# Patient Record
(demographics unavailable — no encounter records)

---

## 2018-03-11 NOTE — NUR
PT AWAKE WATCHING TELEVISION, BS RECHECKED 271. PT DENIES PAIN @ THIS TIME. NO HEADACHE OR 
DIAPHORESIS NOTED. INSULIN DRIP RUNNING @ 3 UNIT/HR. NO OTHER S/S OF DISTRESS NOTED WILL 
CONTINUE TO OBSERVE.

## 2018-03-11 NOTE — NUR
PT AWAKE ALERT ORIENTED X4. PERRLA +3. STRONG UPPER AND LOWER EXTREMITIES. SINUS TACH 
110-120S. NO EDEMA NOTED. LUNGS CLEAR, DIMINISHED @ BASES. ABD SOFT NON DISTENDED. PT HAD 
DINNER TONIGHT 80-90% EATEN. PT AMB @ BEDSIDE TO BEDSIDE COMMODE. SKIN INTACT. PERIPHERAL 
IVS NOTED TO L HAND AND R ANTECUBITAL. INSULIN DRIP @ 5 UNITS/HR. PT DENIES FEVER, COUGH, 
LIGHTHEADEDNESS AND PAIN @ THIS TIME. NO OTHER S/S OF ACUTE DISTRESS NOTED. WILL CONTINUE TO 
OBSERVE.

## 2018-03-11 NOTE — NUR
AT 1345 MA RECEIVED FROM ER VIA GURNEY. PT A/O X4, ABLE TO MAKE NEEDS KNOWN. ORIENTED TO 
ICU, STAFF, BED, CALL LIGHT. PT ON BEDSIDE MONITOR. SKIN DRY AND WARM TO TOUCH. PUPILS 
REACTIVE TO LIGHT. LUNGS SOUND CLEAR ON AUSCULTATION. PERIPHERAL LINE ON RIGHT UPPER ARM 20 
G. 0.9% SODIUM CHLORIDE BOLUS RUNNING. IV SITE INTACT. ABDOMEN SOFT ROUND AND NON-TENDER. 
ACTIVE BOWEL SOUND ON AUSCULTATION. SKIN INTACT. DENIES PAIN AT THIS TIME. KEPT HOB 
ELEVATED. PLACED SCDS ON BLE AS PER ORDER. WILL CONTINUE TO MONITOR.

## 2018-03-11 NOTE — NUR
SPOKE WITH DR. GONZALES (ON CALL) TO CLARIFY IVF ORDERS. DR. FARNSWORTH ORDERED NS  
MLS/HR AND NS WITH 20MEQ KCL  MLS/HR (ONCE). INFORMED DR. GONZALES THAT CURRENT 
POTASSIUM LEVEL IS 4.4, BS AT 2000 . DR. GONZALES ORDERED TO DC NS  MLS AND 
CONTINUE NS WITH 20 % KCL  MLS/HR.

## 2018-03-11 NOTE — NUR
PT RESTING IN BED COMFORTABLY. NO RESPIRATORY DISTRESS. ST ON MONITOR. NO CHANGE IN LOC. 
WILL CONTINUE TO MONITOR.

## 2018-03-11 NOTE — NUR
report received from am nurse. orders labs reviewed. no acute distress noted. will continue 
to observe.

## 2018-03-11 NOTE — NUR
Patient will be admitted to care of DR FARNSWORTH. Admited to ICU.  Will go to room 
ICU5. Belongings list completed.  Report to JUDE REYNA.

## 2018-03-11 NOTE — NUR
PATIENT PRESENTS TO ED WITH c/o generalized weakness x1 week

hx none; DENIES N/V/D; SKIN IS PINK/WARM/DRY; AAOX4 WITH EVEN AND STEADY GAIT; 
LUNGS CLEAR BL; HR EVEN AND REGULAR; PT DENIES ANY FEVER, CP, SOB, OR COUGH AT 
THIS TIME; PATIENT STATES PAIN OF 0/10 AT THIS TIME; VSS; PATIENT POSITIONED 
FOR COMFORT; HOB ELEVATED; BEDRAILS UP X2; BED DOWN. ER MD MADE AWARE OF PT 
STATUS.

## 2018-03-12 NOTE — NUR
SHIFT ASSESSMENT DONE. PATIENT AWAKE, ALERT AND ORIENTED X 4. VERBALLY RESPONSIVE, ABLE TO 
MAKE NEEDS KNOWN. SKIN WARM AND DRY TO TOUCH, RESPIRATIONS EVEN AND UNLABORED. NO APPARENT 
PHYSICAL DISTRESS. LUNG SOUNDS EQUAL BILATERALLY. RECEIVED ON INSULIN DRIP HUMULIN R 3 
UNITS/HR. NO SIGNS/SYMPTOMS OF HYPERGLYCEMIA NOTED. WILL CONTINUE TO MONITOR BLOOD SUGAR 
Q1H. ABLE TO MOVE ALL EXTREMITIES. DENIES PAIN OR DISCOMFORT. SAFETY PRECAUTIONS MAINTAINED, 
BED ON LOW POSITION, CALL LIGHT WITHIN REACH, BED RAILS X 3, REMINDED TO USE CALL LIGHT 
WITHIN REACH.

## 2018-03-12 NOTE — NUR
CM NOTE

CONCURRENT REVIEW FAXED TO [facility] / FAX# [phone], ATTN: SONNY #[phone]

## 2018-03-12 NOTE — NUR
PATIENT'S FAMILY AT BEDSIDE, UPDATED ON PATIENT'S CONDITION. PRESENTS NO SIGNS OF DISTRESS 
AT THIS TIME. WILL CONTINUE TO MONITOR

## 2018-03-12 NOTE — NUR
RECEIVED BEDSIDE REPORT FROM NIGHT SHIFT RN,RICH, FOR CONTINUITY OF CARE. PATIENT OBSERVED 
RESTING IN BED, SEMI FOWLERS IN LOWEST POSSIBLE POSITION, AAOX4, ABLE TO FOLLOW COMMANDS AND 
MAKE NEEDS KNOWN. SKIN IS INTACT, WARM AND DRY. VVS, SR ON MONITOR, NO COMPLAINTS OF PAIN AT 
THIS TIME. LUNG SOUNDS ARE CLEAR, REGULAR AND SYMMETRICAL. S1 AND S2 HEART SOUNDS HEARD, CAP 
REFILL LESS THAN 3 SECS. PATIENT HAS PERIPHERAL IV TO LEFT HAND, ASYMPTOMATIC, PATENT, AND 
INTACT WITH INSULIN DRIP RUNNING AT 3UNITS/HR. LAST BS  PER NIGHT SHIFT RN. PATIENT 
HAS ANOTHER PERIPHERAL IV TO RIGHT AC, ASYMPTOMATIC, PATENT, AND INTACT. NO SIGNS OF 
DISTRESS NOTED, PATIENT DENIES ANY FEVER OR COUGH AT THIS TIME. WILL CONTINUE TO MONITOR.

## 2018-03-12 NOTE — NUR
PROVIDED PATIENT WITH DINNER TRAY, NO SIGNS OF DISTRESS NOTED. DENIES ANY PAIN OR WEAKNESS 
AT THIS TIME. WILL CONTINUE TO MONITOR

## 2018-03-12 NOTE — NUR
ACCUCHECK DONE: 271 MG/DL. CONTINUE ON INSULIN DRIP 3 UNITS/HR. NO SIGNS/SYMPTOMS OF 
HYPERGLYCEMIA. WILL CONTINUE TO MONITOR.

## 2018-03-12 NOTE — NUR
Accucheck done: 262 mg/dl. Continue on 3 units/hr. No signs/symptoms of hyperglycemia noted. 
Will continue to monitor.

## 2018-03-12 NOTE — NUR
GAVE BEDSIDE REPORT TO NIGHT SHIFT RN, NAVEEN, FOR CONTINUITY OF CARE. PATIENT PRESENTS NO 
SIGNS OF DISTRESS AT THIS TIME.

## 2018-03-12 NOTE — NUR
Accucheck done: 261 mg/dl. Continue on 3 units/hr. No signs/symptoms of hyperglycemia noted. 
Will continue to monitor.

## 2018-03-12 NOTE — NUR
BS CHECKED 245 - INSULIN DRIP @ 2 UNITS/HR. PT RESTING IN BED NO ACUTE S/S OF DISTRESS NOTED 
WILL CONTINUE TO OBSERVE.

## 2018-03-12 NOTE — NUR
ACCUCHECK: 287 MG/DL. CONTINUE ON INSULIN DRIP 3U/HR. NO S/SX OF HYPERGLYCEMIA NOTED. WILL 
CONTINUE TO MONITOR.

## 2018-03-13 NOTE — NUR
ACCUCHECK DONE: 234 MG/DL. CONTINUE INSULIN DRIP 2 UNITS/HR PER PROTOCOL. NO SIGNS/SYMPTOMS 
OF HYPERGLYCEMIA. WILL CONTINUE TO MONITOR.

## 2018-03-13 NOTE — NUR
PATIENT HAS BEEN SCREENED AND CATEGORIZED AS HIGH NUTRITION RISK. PATIENT WILL BE SEEN 
WITHIN 1-2 DAYS OF ADMISSION.



3/12/18 - 3/13/18



NYA HOLLINS RD

## 2018-03-13 NOTE — NUR
ACCUCHECK DONE 236 MG/DL. CONTINUE ON 2 UNITS/HR. NO S/SX OF HYPERGLYCEMIA NOTED. CONTINUE 
TO MONITOR.

## 2018-03-13 NOTE — NUR
ACCUCHECK DONE: 249 MG/DL. CONTINUE INSULIN DRIP 2 UNITS/HR PER PROTOCOL. NO SIGNS/SYMPTOMS 
OF HYPERGLYCEMIA. WILL CONTINUE TO MONITOR.

## 2018-03-13 NOTE — NUR
DINNER SERVED. PT IS AAOX4, VITAL SIGNS STABLE. NO C/O PAIN OR DISCOMFORT. NEEDS WELL 
ATTENDED. SAFETY MEASURES ENSURED. WILL CONTINUE TO MONITOR.

## 2018-03-13 NOTE — NUR
DR. FARNSWORTH IN TO SEE AND EXAMINE PT. MADE AWARE OF POTASSIUM LEVEL OF 3.0. WILL FOLLOW UP ON 
ORDERS.

## 2018-03-13 NOTE — NUR
BS CHECKED 231 NOTED, CONTINUOS 2 UNITS FOR INSULIN DRIP. NO ACUTE DISTRESS NOTED. PT DENIES 
PAIN AT THIS TIME. SR ON THE MONITOR. CONTINUE TO MONITOR.

## 2018-03-13 NOTE — NUR
ACCUCHECK DONE: 219 MG/DL. CONTINUE INSULIN DRIP 2 UNITS/HR PER PROTOCOL. NO SIGNS/SYMPTOMS 
OF HYPERGLYCEMIA. ASLEEP AT THIS TIME. WILL CONTINUE TO MONITOR.

## 2018-03-13 NOTE — NUR
ACCUCHECK DONE: 219 MG/DL. CONTINUE INSULIN DRIP 2 UNITS/HR PER PROTOCOL. NO SIGNS/SYMPTOMS 
OF HYPERGLYCEMIA. ASLEEP AT THIS, NO APPARENT DISTRESS. WILL CONTINUE TO MONITOR.

## 2018-03-13 NOTE — NUR
RECEIVED REPORT FROM MORNING SHIFT NURSE RATANA.RN. PT IS AWAKE, ALERT AND ORIENTED X4, 
VERBALLY RESPONSIVE, ABLE TO MAKE NEEDS KNOWN. BILATERAL LUNG SOUND CLEAR. S1 AND S2 HEARD. 
SR ON THE MONITOR. PT IS ON ROOM AIR O2 SAT 97%. NO ACUTE RESPIRATORY DISTRESS NOTED. BOWEL 
SOUND PRESENT AT ALL 4 QUADS.PATIENT HAS PERIPHERAL IVS G20 TO LEFT HAND, G20 TO RIGHT HAND, 
G22 TO RIGHT FOREARM ALL ASYMPTOMATIC, PATENT, AND INTACT. INSULIN DRIP RUNNING AT 3 
UNITS/HR. SKIN IS INTACT, WARM AND DRY. SAFETY PRECAUTIONS IN PLACE. WILL CONTINUE TO 
MONITOR.

## 2018-03-13 NOTE — NUR
RECEIVED BEDSIDE REPORT FROM NIGHT SHIFT RN. PATIENT IS RESTING IN BED, AAOX4, ABLE TO 
FOLLOW COMMANDS AND MAKE NEEDS KNOWN. AFEBRILE. SR ON MONITOR, S1 S2 HEARD. NO C/O PAIN AT 
THIS TIME. PT IS ON ROOM AIR, LUNG SOUNDS CLEAR, BREATHING EVEN AND UNLABORED. ABDOMEN SOFT, 
NONTENDER WITH ACTIVE BOWEL SOUNDS. PATIENT HAS PERIPHERAL IVS G20 TO LEFT HAND, G20 TO 
RIGHT HAND, G22 TO RIGHT FOREARM ALL ASYMPTOMATIC, PATENT, AND INTACT. INSULIN DRIP RUNNING 
AT 2 UNITS/HR. SKIN IS INTACT, WARM AND DRY. NO SIGNS OF DISTRESS NOTED. ABLE TO MOVE ALL 
EXTREMITIES. SAFETY PRECAUTIONS IN PLACE. WILL CONTINUE TO MONITOR.

## 2018-03-13 NOTE — NUR
ACCUCHECK: 238 MG/DL. TITRATED INSULIN DRIP FROM 3 UNITS/HR TO  UNITS/ HR. WILL CONTINUE TO 
MONITOR BLOOD SUGAR Q1H. NO SIGNS/SYMPTOMS OF HYPERGLYCEMIA NOTED.

## 2018-03-13 NOTE — NUR
BS CHECKED 300 NOTED, CONTINUOS 3 UNITS FOR INSULIN DRIP. NO ACUTE DISTRESS NOTED. PT DENIES 
PAIN AT THIS TIME. CONTINUE TO MONITOR.

## 2018-03-13 NOTE — NUR
BS CHECKED 256 NOTED, CONTINUOS 3 UNITS FOR INSULIN DRIP. NO ACUTE DISTRESS NOTED. PT DENIES 
PAIN AT THIS TIME.  SR ON THE MONITOR. CONTINUE TO MONITOR.

## 2018-03-13 NOTE — NUR
NO CHANGE OF CONDITION AT THIS TIME. PT IS AWAKE, AAOX4, VSS. SAFETY PRECAUTIONS IN PLACE. 
WILL CONTINUE TO MONITOR.

## 2018-03-13 NOTE — NUR
3/13/2018

RD INITIAL ASSESSMENT COMPLETED



PLEASE REFER TO NUTRITION ASSESSMENT UNDER CARE ACTIVITY FOR ESTIMATED NUTRITIONAL NEEDS. 



CONTINUE 60 GMS [facility] DIET



NURSING AND DIETARY STAFF TO ENCOURAGE INCREASED INTAKE AS TOLERATED



RD TO FOLLOW-UP IN 3-5 DAYS PATIENT IS MODERATE RISK.



NYA HOLLINS, RD

## 2018-03-13 NOTE — NUR
BS CHECKED 226 NOTED, CONTINUOS 2 UNITS FOR INSULIN DRIP. NO ACUTE DISTRESS NOTED. PT DENIES 
PAIN AT THIS TIME. SR ON THE MONITOR. CONTINUE TO MONITOR.

## 2018-03-13 NOTE — NUR
FAMILY AT BEDSIDE. PT IS STABLE. VITAL SIGNS WNL. NO C/O PAIN OR DISCOMFORT. WILL CONTINUE 
TO MONITOR.

## 2018-03-13 NOTE — NUR
ACCUCHECK DONE 218 MG/DL. CONTINUE ON 2 UNITS/HR. NO S/SX OF HYPERGLYCEMIA NOTED. CONTINUE 
TO MONITOR.

## 2018-03-14 NOTE — NUR
PT TRANSFERRED TO TELEMETRY ROOM 121A. VITAL SIGNS STABLE. NO DISTRESS NOTED. REPORT GIVEN 
TO JUDE BOUDREAUX FOR CONTINUITY OF CARE.

## 2018-03-14 NOTE — NUR
NO CHANGE OF CONDITION AT THIS TIME. VITAL SIGNS STABLE. AAOX4. SAFETY PRECAUTIONS IN PLACE. 
WILL CONTINUE TO MONITOR.

## 2018-03-14 NOTE — NUR
BS CHECKED 217 NOTED, CONTINUOS 2 UNITS FOR INSULIN DRIP AND NS WITH KCL 20MEQ 100ML/HR. NO 
ACUTE DISTRESS NOTED. PT DENIES PAIN AT THIS TIME. SR ON THE MONITOR. CONTINUE TO MONITOR.

## 2018-03-14 NOTE — NUR
BS CHECKED 181 NOTED, DECREASED 1 UNITS FOR INSULIN DRIP. PT IS IN ASLEEP, AROUSABLE TO 
VOICE. NO ACUTE DISTRESS NOTED. PT DENIES PAIN AT THIS TIME. SR ON THE MONITOR. CALL LIGHT 
WITHIN REACH. CONTINUE TO MONITOR.

## 2018-03-14 NOTE — NUR
BS CHECKED 214 NOTED, CONTINUOS 2 UNITS FOR INSULIN DRIP. NO ACUTE DISTRESS NOTED. PT DENIES 
PAIN AT THIS TIME. SR ON THE MONITOR. CALL LIGHT WITHIN REACH. CONTINUE TO MONITOR.

## 2018-03-14 NOTE — NUR
BS CHECKED 187 NOTED, CONTINUE TO 1 UNITS FOR INSULIN DRIP. PT IS IN ASLEEP, AROUSABLE TO 
VOICE. NO ACUTE DISTRESS NOTED. PT DENIES PAIN AT THIS TIME. SR ON THE MONITOR. CALL LIGHT 
WITHIN REACH. CONTINUE TO MONITOR.

## 2018-03-14 NOTE — NUR
BS CHECKED 207 NOTED, CONTINUOS 2 UNITS FOR INSULIN DRIP. PT IS IN ASLEEP, AROUSABLE TO 
VOICE. NO ACUTE DISTRESS NOTED. PT DENIES PAIN AT THIS TIME. SR ON THE MONITOR. CALL LIGHT 
WITHIN REACH. CONTINUE TO MONITOR.

## 2018-03-14 NOTE — NUR
RECEIVED FROM AM RN IN BED AWAKE AND IN SITTING UP POSITION WATCHING TV. A/O X 4.  ANSWERING 
PUZZLES IN A SMALL BOOK.ABLE TO VERBALIZE NEEDS WELL IN ENGLISH. PT. CARE PLANS FOR THE 
NIGHT DISCUSSED WITH HER . ORIENTED X 4. CALL LIGHT WITH IN REACH. DX. NEW DIAGNOSED 
DIABETES AND DKA. DENIES ANY PAIN AT THIS TIME. RE-ORIENTED TO CALL LIGHT USE AND TO CALL 
FOR ANY HELP SHE MAY NEED. BED ALARM ON

## 2018-03-14 NOTE — NUR
RECEIVED FROM ICU VIA [employer]. AWAKE, ALERT, AND ORIENTED X4. IN STABLE CONDITION. KEEP 
COMFORTABLE ON BED. CALL LIGHT WITHIN REACH.

## 2018-03-14 NOTE — NUR
BS CHECKED 205 NOTED, CONTINUOS 2 UNITS FOR INSULIN DRIP. PT IS IN ASLEEP, AROUSABLE TO 
VOICE. NO ACUTE DISTRESS NOTED. PT DENIES PAIN AT THIS TIME. SR ON THE MONITOR. CALL LIGHT 
WITHIN REACH. CONTINUE TO MONITOR.

## 2018-03-15 NOTE — NUR
ADMINISTERED SCHEDULED MEDS. EDUCATED PT ON INSULIN ADMINISTRATION AND TIME. EDUCATED ON 
CARBOHYDRATE MONITORING IN DIET AND EXERCISE. PT VERBALIZED UNDERSTANDING. NO SIGNS OF 
DISTRESS. WILL CONTINUE TO MONITOR.

## 2018-03-15 NOTE — NUR
PT D/C TO GO HOME. GAVE INSTRUCTIONS, HANDOUT, RX, LABS, AND FOLLOW UP APPOINTMENT. PT 
VERBALIZED UNDERSTANDING AND SIGNED FORMS. PT REPEATED TEACHING VERBALLY ABOUT BLOOD GLUCOSE 
MONITORING, INSULIN ADMINISTRATION, METFORMIN, AND MEDICATION SIDE EFFECTS. REMOVED IV FROM 
LEFT HAND 22G AND R FA 22G, IV CATHETER TIPS INTACT. APPLIED DRESSING AND PRESSURE TO SITE. 
NO BLEEDING NOTED. REMOVED ID BANDS. PT CHANGED IN OWN CLOTHES AND LEFT WITH ALL PERSONAL 
BELONGINGS. FAMILY CAME TO  PT. LEFT VIA AMBULATION WITH STEADY GAIT. LEFT IN STABLE 
CONDITION.

## 2018-03-15 NOTE — NUR
SLEEPING WELL THIS SHIFT. NO SOB. NO NOTED S/S OF HYPER /HYPOGLYCEMIA. ABLE TO VERBALIZE 
NEEDS WELL. CALL LIGHT WITH IN REACH.

## 2018-03-15 NOTE — NUR
EDUCATED PT ON BLOOD GLUCOSE MONITORING AND INSTRUCTIONS. PT DEMONSTRATED BLOOD SUGAR CHECK 
APPROPRIATELY ON LEFT FINGER. BS . VERBALIZED UNDERSTANDING. NUTRITION TEACHING ON 
DIABETIC DIET DONE BY DIETICIAN AND HANDOUT PROVIDED.

## 2018-03-15 NOTE — NUR
RECEIVED REPORT FROM NIGHT SHIFT NURSE MARJORIE AT BEDSIDE FOR CONTINUITY OF CARE. PT IS AWAKE 
AND ORIENTED. INTRODUCED SELF AND UPDATED BOARD. NO SIGNS OF DISTRESS. PT DENIES PAIN. BED 
IN LOW POSITION, WHEELS LOCKED, CALL LIGHT WITHIN REACH WILL CONTINUE TO MONITOR.

## 2018-03-15 NOTE — NUR
EDUCATED PT ON INSULIN ADMINISTRATION ON LANTUS AND HUMALOG. PT DEMONSTRATED TEACHING BY 
SELF ADMINISTERING HUMALOG 8UNITS SUBQ TO ABD APPROPRIATELY. PT TOLERATED WELL AND 
VERBALIZED UNDERSTANDING.

## 2018-03-15 NOTE — NUR
SLEEPING WELL. NO RESTLESSNESS NOTED. CALL LIGHT AT  BEDSIDE. ENCOURAGED TO CALL FOR ANY 
HELP SHE MAY NEED. INDEPENDENT.

## 2020-01-16 NOTE — NUR
ENDORSED PT TO PM SHIFT RN. PT VITALS . /82, RR 30, O2 SATS 100%. 

-------------------------------------------------------------------------------

Addendum: 01/16/20 at 1915 by Lauren Diaz RN

-------------------------------------------------------------------------------

DR. HILL IN UNIT, MADE AWARE.

## 2020-01-16 NOTE — NUR
INSULIN R DRIP STARTED AT THIS TIME. EDUCATED PT ON S/S OF HYPOGLYCEMIA. DENIES PAIN. ALL 
NEEDS BEING MET. CALL LIGHT WITHIN WILL CONTINUE TO MONITOR.

## 2020-01-16 NOTE — NUR
RECEIVED PT FROM DAY SHIFT RESTING IN BED. RESPONDS TO VERBAL STIMULI. DENIES PAIN UPON 
QUESTIONING. FLACC 0. FAMILY AT BEDSIDE. PERRL. O2 RUNNING @ 2LPM VIA NC. SPO2 @ . IV 
SITES PATENT TO RT AC AND LT AC, 1 L INFUSING D5 1/2 NS WITH 40 MEQ, NS @ 250ML/HR, SODIUM 
BICARB IN D5 1/2 NS @ 100 ML/HR. SINUS TACH ON MONITOR 120-130 BPM. + S1, S2 UPON 
AUSCULTATION. BOWEL SOUNDS ACTIVE X4. ABDOMEN SOFT, NON-DISTENDED, NON-TENDER. SKIN WARM, 
DRY AND INTACT.  @ THIS TIME. SAFETY PRECAUTIONS IN PLACE. BED LOW AND LOCKED. CALL 
LIGHT LEFT WITHIN REACH. WILL CONTINUE TO MONITOR.

## 2020-01-16 NOTE — NUR
TRANSFERRED PT FROM ER TO ICU BY JAYLEN. PT DROWSY BUT ABLE TO ANSWER QUESTIONS. BOYFRIEND 
WITH PT,ALL PERSONAL BELONGINGS WITH PT. BEDSIDE MONITOR SHOWS , RR 30, SPO2 =99%. BP 
142/85.

## 2020-01-16 NOTE — NUR
AB PAIN, NAUSEA, AND CONSTIPATION X 4 DAYS. LAST BM 4 DAYS AGO

APPROX 6 AND A HALF WEEKS PREGNANT

 3, 2 MISCARRIAGES, LIVING 1

PMH- DM. DENIES V/D; SKIN IS PINK/WARM/DRY; AAOX4 WITH EVEN AND STEADY GAIT; 
LUNGS CLEAR BL; HR EVEN AND REGULAR; PT DENIES ANY FEVER, CP, SOB, OR COUGH AT 
THIS TIME; PATIENT STATES PAIN OF 10/10 AT THIS TIME; VSS; PATIENT POSITIONED 
FOR COMFORT; HOB ELEVATED; BEDRAILS UP X2; BED DOWN. ER MD MADE AWARE OF PT 
STATUS.

## 2020-01-16 NOTE — NUR
D5 1/2 NS STOPPED AT THIS TIME. SODIUM BICARB IVP, K RIDER 40 MEQ ORDERED. ADMINISTERED AS 
ORDERED WITHOUT INCIDENT. BED LOW AND LOCKED. WILL CONTINUE TO MONITOR FOR CHANGES.

## 2020-01-17 NOTE — NUR
RAMIN assessment/discharge plan



 Name: 

Saul Simmons

Home Tel: 

[phone]

Relationship: 

brother

Pre-Admission Living Arrangements: 

Lives with Other

Other: 

family

Prior ADL 

 Independent

Current Home Health Name/Tel: 

N/A

Current DME/02 Name/Tel: 

N/A

Current Hospice Name/Tel: 

N/A

Current Dialysis Name/Tel: 

N/A

Healthcare Decision Maker: 

Patient

Advance Directive 

No

Information Taught: 

Community Resources

Person Taught: 

Patient

Teaching Tools: 

Community Resources

Verbal

Factors Affecting Learning: 

None

Participation Level: 

 Active

Evaluation: 

Gestures Understanding

Verbalizes Understanding

Educator: 

Elizabeth Reyes, MSW

Discipline: 

Case Mgt/Social Svcs

Tentative Discharge Plan Summary: 

Patient is a 29 year old female admitted for DKA. I met with patient at 

bedside. Patient alert and oriented x4. Patient lives at home with her 

family and plans to return home upon discharge. Patient goes to [facility] 

[facility] for medical care. She denied hx of mental health. She denied 

alcohol/substance abuse. I provided her with education on [facility] 

[facility]org for community resources. She does not have any 

questions/concerns at this time.  and/or  will 

follow up as needed. 

 Signature: 

Elizabeth Reyes, MSW

Date: 

Jan 17, 2020

## 2020-01-17 NOTE — NUR
RECEIVED PT FROM DAY SHIFT RESTING IN BED. A/O X4, VERBALLY RESPONSIVE. DENIES PAIN UPON 
QUESTIONING. FAMILY AT BEDSIDE. PERRL.  IV SITES PATENT TO RT FA, 20G, AND LT HAND 22G. NS 
WITH K-RIDER 20 MEQ, NS @ 200ML/HR, REGULAR INSULIN DRIP @ 7ML/HR. SINUS TACH ON MONITOR 
101-110 BPM. + S1, S2 UPON AUSCULTATION. BOWEL SOUNDS ACTIVE X4. ABDOMEN SOFT, 
NON-DISTENDED, NON-TENDER. WEEKS IN PLACE DRAINING CLEAR, YELLOW, URINE TO GRAVITY. SKIN 
WARM, DRY AND INTACT.  @ THIS TIME. SAFETY PRECAUTIONS IN PLACE. BED LOW AND LOCKED. 
CALL LIGHT LEFT WITHIN REACH. WILL CONTINUE TO MONITOR.

## 2020-01-17 NOTE — NUR
ABLE TO VOID WITHOUT DIFFICULTY. APPROX 300CC CLEAR, YELLOW, URINE, VIA BEDSIDE COMMODE. NO 
C/O PAIN OR RETENTION NOTED. SAFETY PRECAUTIONS REMAIN IN PLACE. BED LOW AND LOCKED. CALL 
LIGHT WITHIN REACH.

## 2020-01-17 NOTE — NUR
1/17/20  RD INITIAL ASSESSMENT COMPLETED



PLEASE REFER TO NUTRITION ASSESSMENT UNDER CARE ACTIVITY FOR ESTIMATED NUTRITIONAL NEEDS. 



1. CONTINUE [facility] 60 GM DIET AS TOLERATED 

2. RD WILL PROVIDE [facility] NUTRITION EDUCATION TO PT

3. RD TO FOLLOW-UP 2-3 DAYS, HIGH RISK 



RAMON IGLESIAS RD

## 2020-01-17 NOTE — NUR
PT REMOVED IV TO RT AND LT AC. REPLACED LINES TO RT FA 20G AND LT HAND 22G. TOLERATED WELL. 
NO C/O PAIN. SAFETY PRECAUTIONS REMAIN IN PLACE WITH BED LOW AND LOCKED. CALL LIGHT WITHIN 
REACH. WILL CONTINUE TO MONITOR .

## 2020-01-17 NOTE — NUR
MADE DR HILL AWARE THAT PT COUGH MORE FREQ AND HAS TACHYPNEA. DR HILL ORDERED 
ROBITUSSIN PRN AND ALBUTEROL PRN.

## 2020-01-17 NOTE — NUR
RECEIVED PT FROM NIGHT SHIFT RN. PT IS RESTING BED, AOX4. DENIES PAIN, DENIES NAUSEA AND 
VOMITING. PERRLA. NO S/S OF DISTRESS ON RM AIR. O2 SAT 99%. IV CATH TO LEFT HAND 22G AND R 
FA 20G, BOTH PATENT, AND INTACT. INSULIN DRIP RUNNING AT 3.5ML/HR AND D5 1/2 NS AT 150ML/HR. 
K RIDER AT 10MEQ/HR. SINUS TACH ON MONITOR. LUNG SOUNDS CLEAR. BOWEL SOUNDS ACTIVE X4. 
ABDOMEN SOFT, NON-TENDER. SKIN WARM, DRY AND INTACT. SAFETY PRECAUTIONS IN PLACE. BED LOCKED 
IN LOWEST POSITION. CALL LIGHT WITHIN REACH. WILL CONTINUE TO MONITOR.

## 2020-01-17 NOTE — NUR
PATIENT HAS BEEN SCREENED AND CATEGORIZED AS HIGH NUTRITION RISK. PATIENT WILL BE SEEN 
WITHIN 1-2 DAYS OF ADMISSION.



01/17/20-01/18/20



RAMON IGLESIAS RD

## 2020-01-18 NOTE — NUR
MEDICATED WITH PRN TYLENOL AS ORDERED. ICE PACK PROVIDED. NO ASE NOTED. VSS. PT RESTING IN 
BED, IN NO ACUTE DISTRESS. SAFETY PRECAUTIONS REMAIN IN PLACE. BED LOW AND LOCKED. CALL 
LIGHT WITHIN REACH. WILL CONTINUE TO MONITOR.

## 2020-01-18 NOTE — NUR
PATIENT STATES HEADACHE BETTER D/T TYLENOL. STILL REFUSES ROBITUSSIN. INTERMITT. COUGH 
PRESENT. DR. MCGILL PRESENT AT BEDSIDE. UPDATED MD THAT PATIENT HAS BAD MIX OF BM/URINE AND 
UNABLE TO DIFFERENTIATE FL ER PATIENT. STATES NURSE NOTICED POTASSIUM PILL IN THE BEDSIDE 
COMMODE URINE / BM BEDPAN. STATES WILL CONTINUE TO MONITOR AT THIS TIME. NO SCENT FROM BM. 
DENIES PAIN DENIES CRAMPING. MD AWARE. AFEBRILE. VSS. NO SOB NOTED. DENIES BLEEDING. WILL 
CONTINUE TO MONITOR.

## 2020-01-18 NOTE — NUR
DENIES PAIN @ THIS TIME. BED BATH GIVEN. CATH CARE PROVIDED. REPOSITIONED WITH HOB @ 45 
DEGREES AND PRESSURE AREAS OFFLOADED. SAFETY PRECAUTIONS REMAIN IN PLACE. BED LOW AND 
LOCKED. CALL LIGHT WITHIN REACH. WILL CONTINUE TO MONITOR.

## 2020-01-18 NOTE — NUR
PT C/O HEADACHE, TYLENOL  650 MG GIVEN AS ORDERED. ASSISTED PT TO USE BEDSIDE COMMODE. PT 
HAD LOOSE BM, WILL MONITOR.

## 2020-01-18 NOTE — NUR
C/O HEADACHE @ 5/10 PAIN ON ADULT SCALE. VSS. REQUESTING TYLENOL. MD NOTIFIED. NEW ORDERS 
OBTAINED AND IMPLEMENTED.

## 2020-01-18 NOTE — NUR
entered room to take bs. patient states will try to sleep at this time. boyfriend still 
present. no pain noted. refuses robitussin at this time. vss. no sob noted. will continue to 
monitor.

## 2020-01-18 NOTE — NUR
PT BS >200, PER PROTOCOL, CHANGE INSULIN DRIP FROM 0.05 UNITS/KG/HR (3.5 CC/HR) TO 0.1 
UNITS/KG/HR ( 7 CC/HR).  PER ORDER CHANGE IVF FROM  D5 0.9 NS 20 MEQ  CC/HR TO 0.9 NS 
20 MEQ @ 200 CC/HR. CARRIED OUT.

## 2020-01-18 NOTE — NUR
ASSISTED PT TO BEDSIDE COMMODE WITHOUT INCIDENT. VOIDED APPROX 400 ML CLEAR, YELLOW URINE. 
VSS. DENIES PAIN. ALL OTHER NEEDS BEING MET. SAFETY PRECAUTIONS REMAIN IN PLACE. WILL 
CONTINUE TO MONITOR.

## 2020-01-18 NOTE — NUR
entered room with day shift nurse. patient in bed sitting up. aaox4. room air. perrla. brisk 
rxn. able to make needs known. able to obey commands. denies pain. left fa periph. iv site. 
right fa periph. iv site. running insulin 3.5ml/hr. and d5 1/2 ns at 150 ml/hr. patient has 
potassium phosphate running as ordered. intact skin. able to get up to use bedside commode. 
boyfriend present at bedside. vss. call light within reach. no sob or distress noted. ccho 
diet. states been having "pee/diarrhea mix" day nurse states noted potassium pill in the 
bedside commode. st. regular hr. lungs clear. bowel sounds active in all 4 quad. patient 
states pregnant at this time. pulses felt in all 4 quad. normal skin color. will continue to 
monitor. denies any vaginal bleeding at this time. able to move around in bed. watching tv 
with boyfriend. no questions at this time.

## 2020-01-18 NOTE — NUR
RECEIVED BEDSIDE REPORT FROM PM SHIFT RN. PT AWAKE ,ALERT. ON RA, NO S/S OF RESPIRATORY 
DISTRESS NOTED. BEDSIDE MONITOR SHOWS ST-SR. PT HAS IV TO LEFT HAND # 22 RUNNING INSULIN 
DRIP AT 3.5 CC/HR AND RIGHT FA RUNNING D5 1/2 NS 20 MEQ KCL  CC/HR. PT ABLE TO MOVE 
HER SELF, INTRODUCED MYSELF, POC EXPLAINED , PT VERBALIZED UNDERSTANDING, CALL LIGHT IN 
REACH, WILL CONTINUE TO MONITOR.

## 2020-01-18 NOTE — NUR
PATIENT WATCHING TV WITH BOYFRIEND. TYLENOL GIVEN AS ORDERED WITH RAFAEL. STATES HAVE MILD 
HEADACHE D/T COUGHING BUT REFUSES ROBITUSSIN D/T BELIEFS OF IT GIVING HER BODY DIARRHEA. BM 
LOOSE/WITH URINE IN BEDSIDE COMMODE.PT HAS CHUX PRESENT ON BEDPAN/BEDSIDE COMMODE AS WELL. 
MOVED BACK AND FORTH TO BED WITH STANDBY ASSISTANCE WITHOUT ANY INJURY. VSS. NO SOB OR 
DISTRESS NOTED. WILL CONTINUE TO MONITOR.

## 2020-01-18 NOTE — NUR
ORAL CARE PROVIDED. REPOSITIONED WITH PRESSURE AREAS OFFLOADED. PERICARE PROVIDED. SAFETY 
PRECAUTIONS REMAIN IN PLACE. BED LOW AND LOCKED WITH SIDE RAILS UP. CALL LIGHT WITHIN REACH. 
WILL CONTINUE TO MONITOR

## 2020-01-19 NOTE — NUR
CALLED DR HUTSON WHEN RECEIVED LAB HCG RESULT 8918 . NOTIFIED HIM OF THE LAB RESULT. 
STATES PATIENT NOW DENIES CRAMPING. READ IMPRESSIONS RESULT OF THE ULTRASOUND TO MD. MD 
STATES "I'LL HAVE TO SPEAK WITH THE PATIENT." WILL NOTIFY PATIENT THAT MD WILL BE IN TO 
SPEAK WITH HER IN AM PER MD. 

-------------------------------------------------------------------------------

Addendum: 01/20/20 at 0647 by Yun Riggins RN

-------------------------------------------------------------------------------

DR MCGILL PRESENT AT BEDSIDE AND NOTIFIED OF ALL OF THESE THINGS. PATIENT STATES NO CRAMPING 
ANYMORE. NOTIFIED ARTEM OF MILD INTERMITT. COUGH STILL. REFUSES ELISHA D/T "GIVING ME 
DIARRHEA". PATIENT SPOKE WITH MD WILL CONTINUE TO MONITOR. "CONTINUE ROCEPHIN" PER MD ORDERS

## 2020-01-19 NOTE — NUR
BED PAN REMOVED FROM PATIENTS ROOM FROM PRIOR SHIFT. PATIENT STATES FORMED BM AND URINE 
TOGETHER IN BEDSIDE COMMODE. VSS. NO ABNORMAL SMELLS. NO SOB OR DISTRESS NOTED.

## 2020-01-19 NOTE — NUR
RECEIVED BEDSIDE REPORT FROM PM SHIFT RN. PT AWAKE ,ALERT. ON RA, NO S/S OF RESPIRATORY 
DISTRESS NOTED. BEDSIDE MONITOR SHOWS SR. PT HAS IV TO LEFT FA# 22 RUNNING INSULIN DRIP AT 
3.5 CC/HR AND RIGHT FA RUNNING D5 1/2 NS 20 MEQ KCL  CC/HR. PT ABLE TO MOVE HER SELF, 
INTRODUCED MYSELF, POC EXPLAINED , PT VERBALIZED UNDERSTANDING, CALL LIGHT IN REACH, WILL 
CONTINUE TO MONITOR. PER PM NURSE AND PT, PT DID NOT HAVE DIARRHEA DURING NIGHT SHIFT.

## 2020-01-19 NOTE — NUR
PT TOLD ME SHE HAS CRAMPING AT THIS TIME, WIPED HERSELF WITH BLOOD. CHECKED HCG SERUM, NO 
REPORT YET BUT IN ORDER HX SHOWED COMPLETED, CALLED LAB TO CLARIFY, PER LAB CLS, THEY SAID 
THEY ADD MORNING BLOOD TO THE TEST, TOLD THEM TO DRAW NEW BLOOD TO CHECK HCG.

## 2020-01-19 NOTE — NUR
CALLED , UPDATED BMP. NOTIFIED DR. GRIER ORDERED LANTUS 12 UNITS SUBQ, PER DR. CAMPBELL, D/C LANTUS 12 UNITS SUBQ AND CHANGE TO HUMALOG 4 UNITS AND NPH 20 UNITS NOW, STOP IV 
INSULIN 20 MINUTES LATER AND CHECK BS Q4HRS, FOLLOW HUMALOG SLIDING SCALE Q 4 HRS. GIVE PT 
D51/2 NS WITH 20 MEQ KCL @ 150 CC/HR, WILL CARRY OUT.

## 2020-01-19 NOTE — NUR
DR CAMPBELL CALLED. ASKED WHAT BS WAS. NOTIFIED BS  AT 2000. STATES TO GIVE 30 UNITS OF 
NPH TOMORROW AT 0900 SUB Q IN THE MORNING. READ BACK. MD AGREED. WILL PLACE ORDER IN PER MD 
ORDERS

## 2020-01-19 NOTE — NUR
SPOKE WITH LAB MARÍA, STATES HCG RESULT 8919 STILL HIGH BUT LOWER THAN PREVIOUS AM RESULT. 
WILL F/U WITH MD.

## 2020-01-19 NOTE — NUR
at bedside to assess pt. notified pt is pregnant, is it ok to use 
PHENERGAN/CODEINE. per dr. nolen d/c PHENERGAN/CODEINE.

## 2020-01-19 NOTE — NUR
, INCREASED INSULIN DRIP FROM 3.5 CC/HR TO 7.0 CC/HR. ALSO CHANGED IV FLUID TO 0.9 NS 
20 MEQ KCL  CC/HR.

## 2020-01-19 NOTE — NUR
CALLED , NOTIFIED PT HAS COUGH AND GAVE PT ROBITUSSIN WITH MILD HELP. PER DR. GRIER, 
GIVE PT PHENERGAN/CODEINE 5ML Q 6HR PRN AND ORDER CBC, BMP, MG, K, PHOSPHORUS FOR PT. 
CARRIED OUT.

## 2020-01-19 NOTE — NUR
PATIENT UP FOR BEDSIDE COMMODE WITH ASSISTANCE OF BF AND STANDBY NURSE. PATIENT MOVED BACK 
AND FORTH WITHOUT INJURY. NOTED URINE AND 2ND BEDPAN USED FOR SOFT BROWN STOOL. STATES NO 
PAIN. NO BLEEDING NOTED. CHUX IN BEDPAN. UNABLE TO MEASURE. NO SOB OR DISTRESS NOTED. 
PATIENT ABLE TO MAKE NEEDS KNOWN. OBEYS COMMANDS. WILL CONTINUE TO MONITOR.

## 2020-01-19 NOTE — NUR
RECEIVED PHONE CALL FROM DR. HUTSON, NOTIFIED PT HAD A LITTLE BIT BLOOD WHILE PT WIPED HER 
SELF. RECEIVED ORDER TO ORDER 1 ST TRIMESTER US AND HCG SERUM.

## 2020-01-19 NOTE — NUR
entered room to take blood sugar. patient sleeping with boyfriend present at bedside. aaox4. 
states "im fine" given hand to assist in bs check. denies needing anything at this time. 
will continue to monitor. vss. no sob or distress noted.

## 2020-01-19 NOTE — NUR
entered room to take bs. bf present watching tv at bedside. patient lying on side. vss. no 
sob or distress noted. will continue to monitor at this time.

## 2020-01-19 NOTE — NUR
ENDORSED PT CARE , HCG REPORT AND US REPORT TO PM SHIFT RN AND CHARGE NURSE. ENSURE TO CALL 
OB MD . PT AWAKE, ALERT. VITAL STABLE.

## 2020-01-19 NOTE — NUR
patient got up to use bedside commode with bf assistance. no injuries noted. used 2 bedpans. 
small amount of bm in one and urine in both. no abnormal smells noted. vss. able to make 
needs known. refused morning care at this time. states will ask if needs it but wants to 
sleep more. will continue to monitor.

## 2020-01-19 NOTE — NUR
CALLED PT'S OB MD DR. HUTSON, VOICE MESSAGE LEFT REGARDING PT WENT TO BEDSIDE COMMODE 
ABOUT SEVERAL MINUTES AGO, SHE WIPED HERSELF WITH A LITTLE BIT BLOOD. PT DENIES CRAMPING OR 
OTHER DISCOMFORT. BOYFRIEND AT BEDSIDE. WILL F/U.

## 2020-01-19 NOTE — NUR
CHANGE OF SHIFT REPORT AT BEDSIDE WITH DAY SHIFT NURSE DELILAH. PATIENT AWAKE AND ALERT X4. 
BOYFRIEND AT BEDSIDE. PATIENT SITTING UP. STATES NO DIARRHEA TODAY. STATES MILD CRAMPING BUT 
DOES NOT FEEL LIKE A MISCARRIAGE. PATIENT STATES HAS HAD A MISCARRIAGE BEFORE. PATIENT 
STATES MILD BLEEDING ONLY WHEN WIPING BUT NOT IN URINE. DENIES PAIN. ROOM AIR. PERRLA 
BILATERALLY. HR REGULAR. SR-ST. RESP SYMMETR. AND UNLABORED. LUNG SOUNDS CLEAR. BOWEL SOUNDS 
ACTIVE. SKIN INTACT, PULSES PRESENT IN ALL 4 QUAD. AAOX4. ABLE TO OBEY COMMANDS. ABLE TO 
MAKE NEEDS KNOWN. SMILING. SPEAKING CLEARLY TO STAFF. DENIES NEEDING ANYTHING AT THIS TIME. 
WILL CONTINUE TO MONITOR.

## 2020-01-19 NOTE — NUR
PATIENT REQUESTED TO USE BEDSIDE COMMODE. STILL ATTACHED TO MONITORS. BF ASSISTED IN 
PROCESS. NO INJURY DURING PROCESS. BEDPAN OF FORMED SOFT BM AND URINE NOTED. PATIENT DENIES 
CRAMPING AT THIS TIME. BACK IN BED SAFELY. VSS. TAPED UP SALINE LOCKED IVS.NO SOB OR 
DISTRESS NOTED. WILL CONTINUE TO MONITOR AT THIS TIME. BED ALVAREZ REMOVED FROM ROOM.

## 2020-01-20 NOTE — NUR
PATIENT PUSHED CALL LIGHT TO NOTIFY THAT SHE WANTED TO USE THE BEDSIDE COMMODE. REQUESTED BF 
ASSIST. PATIENT STABLE TO MOVE TO BEDSIDE COMMODE. REFUSED NEEDING ANYTHING AT THIS TIME. 
MONITORING PATIENT VS ON MONITOR AND CHECKING FOR INJURY. VSS. NO SOB OR DISTRESS NOTED. 
AAOX4. ABLE TO MAKE NEEDS KNOWN. WILL CONTINUE TO MONITOR.

## 2020-01-20 NOTE — NUR
PATIENT LYING IN BED. NO SOB NOTED NO S/S OF DISTRESS NOTED. BF STILL AT BEDSIDE. STABLE 
CONDITION. WILL ENDORSE TO DAY SHIFT ABOUT NPH ORDER FROM ADRIAN ABOUT FREQUENCY OF ORDER.

## 2020-01-20 NOTE — NUR
PATIENT ASSISTED BACK TO BED. BF PRESENT. NO INJURY NOTED. BM AND URINE BOTH IN BEDPAN. 
PATIENT STATES "IM OK AT THIS TIME". AAOX4. ABLE TO MAKE NEEDS KNOWN.

## 2020-01-20 NOTE — NUR
DC INSTRUCTIONS AND RX GIVEN AND EXPLAINED TO PT AND PT'S BOYFRIEND JUNAID, THEY VERBALIZED 
FULL UNDERSTANDING, DIABETES TEACHING, INSULIN INJECTION INSTRUCTIONS GIVEN, PT AND 
BOYFRIEND RETURN DEMONSTRATED WITH GOOD TECHNIQUE.  ALL QUESTIONS ASKED AND ANSWERED, PT 
STATES SHE WILL GO EAT LUNCH NOW, 2 UNITS GIVEN PER SLIDING SCALE ORDER, PT UP OUT OF BED 
WITHOUT PROBLEM, AMBULATES WITH STEADY GAIT, DC HOME NOW WITH BOYFRIEND JUNAID.

## 2020-01-20 NOTE — NUR
BEDSIDE REPORT RECEIVED FROM NIGHT SHIFT NURSE LEANDRO, PT AAOX4,RESP EVEN UNLABORED ON RA, 
SKIN WARM DRY COLOR WNL, VITALS STABLE ON MONITOR, D5 1/2NS AT 150ML/HR TO LEFT FA IV, RIGHT 
FA IV SL'D, SITES WNL, PT GETS UP TO BEDSIDE COMMODE WITH MINIMAL ASSIST, POC REVIEWED, PT 
DENIES PAIN OR DISCOMFORT, EXTRA TISSUE BOX PROVIDED PER REQUEST, DENIES OTHER NEEDS AT THIS 
TIME, CALL BELL WITHIN REACH, SIDE RAILS UP, BED LOCKED IN LOW POSITION, ALL SAFETY MEASURES 
IN PLACE, WILL CONTINUE TO MONITOR.

## 2020-01-20 NOTE — NUR
PT UP OUT OF BED WITH MINIMAL ASSIST TO BEDSIDE COMMODE, VOIDED AND HAD A BM, PT REPORTS 
ONLY VAGINAL SPOTTING AND STREAKS WHEN WIPING.

## 2020-01-20 NOTE — NUR
ENTERED PATIENTS ROOM. PATIENT ASLEEP. BF PRESENT AND ASLEEP IN A CHAIR ON THE SIDE OF BED. 
PATIENT GAVE ME HAND TO ASSIST IN BS CHECK. DENIES NEEDING ANYTHING AT THIS TIME. INSULIN 
HUMALOG GIVEN PER MD SLIDING SCALE ORDERS. 4 UNITS SUB Q GIVEN IN LEFT DELTOID. VSS. NO SOB 
NOTED. NO S/S OF DISTRESS NOTED.

## 2020-01-20 NOTE — NUR
DR GRIER AT [facility].

-------------------------------------------------------------------------------

Addendum: 01/20/20 at [facility] by Trinh Pulido RN

-------------------------------------------------------------------------------

PT TO DOWNGRADE TO MEDSURGE AND DISCHARGE PER DR GRIER.

## 2020-01-20 NOTE — NUR
WHEN ENTERED ROOM, PATIENT SLEEPING. BF SLEEPING AT BEDSIDE. BS TAKEN. IVF CHANGED. PATIENT 
WENT BACK TO SLEEP. WILL CONTINUE TO MONITOR AT THIS TIME.

## 2020-02-06 NOTE — NUR
Patient will be admitted to [facility]. Admited to TELE.  Will go to room 
111A. Belongings list completed.  Report to NESHA ROQUE.

## 2020-02-06 NOTE — NUR
RECEIVED PT FROM ER VIA [facility] ER NURSE GIVE A REPORT AT BED SIDE PT IS AAOX4 AMBULATORY 
COMPLAINING FOR DRY COUGH AND ADMITTING FOR HYPOKALEMIA, IV  ON LEFT AC  INFUSING WELL 
ROCEPHIN FROM ER,ALMOST DONE;;, PT IS ORIENTED TO THE FLOOR CALL LIGHT WITHIN REACH

## 2020-02-06 NOTE — NUR
C/O PRODUCTIVE COUGH X BEGINING OF 

PT DENIES PAIN. EXPIRATORY WHEEZING HEARD BILATERALLY. RR EVEN AND UNLABORED

REFERRED FROM URGENT CARE FOR RULE OUT PE. PT DENIES CP. ON 98% RA. 

RECENTLY HAD SPONTANOEUS  BUT PT STATES SHE HAD A FOLLOW UP APPT WITH 
HER PCP AND WAS CLEARED REGARDING 

ACCU CHECK 92

PMH- DM

## 2020-02-07 NOTE — NUR
PT IS AWARE OF DISCHARGE AND WOULD LIKE TO GO HOME BEFORE 3PM. SAFETY MEASURES IN PLACE. 
WILL CONTINUE TO MONITOR.

## 2020-02-07 NOTE — NUR
HOURLY ROUNDING. PT RESTING IN BED WITH  AT BEDSIDE. ABLE TO MAKE NEEDS KNOWN. 
RESPIRATIONS EVEN AND UNLABORED WITH NO SOB OR RESPIRATORY DISTRESS. SKIN WARM AND DRY TO 
TOUCH. SAFETY MEASURES IN PLACE. WILL CONTINUE TO MONITOR

## 2020-02-07 NOTE — NUR
ADMINISTERED SCHED MED AS PRESCRIBED PER MD ORDER. PT TOLERATED WELL. MEDICATION EDUCATION 
PERFORMED. PT VERBALIZED UNDERSTANDING. SAFETY MEASURES IN PLACE. WILL CONTINUE TO MONITOR

## 2020-02-07 NOTE — NUR
PATIENT HAS BEEN SCREENED AND CATEGORIZED AS MODERATE NUTRITION RISK. PATIENT WILL BE SEEN 
WITHIN 3-5 DAYS OF ADMISSION.



02/09/20 02/11/20



RAMON IGLESI[address] RD

## 2020-02-07 NOTE — NUR
Note:



Basic Screen: 

Yes

High Risk DC Screen 

No

 Name: 

RIMA MIN

[place] Tel: 

[phone]

Relationship: 

BROTHER

Pre-Admission Living Arrangements: 

Lives with Other

Prior ADL 

 Independent

Current Home Health Name/Tel: 

N/A

Current DME/02 Name/Tel: 

N/A

Current Hospice Name/Tel: 

N/A

Current Dialysis Name/Tel: 

N/A

Healthcare Decision Maker: 

Patient

Advance Directive 

No

Physician Orders for Life Sustaining Treatment Form 

No

Patient/Family Have Educational Needs 

No

Information Taught: 

 Advance Directive

Person Taught: 

Patient

Teaching Tools: 

Verbal

Factors Affecting Learning: 

None

Participation Level: 

Refused

Needs Additional Education: 

No

Discipline: 

Case Mgt/Social Svcs

Tentative Discharge Plan/Destination: 

No Needs Identified

Will require assistance post discharge: 

No

 Referred to : 

No

Tentative Discharge Plan Summary: 

Patient is a 29-year-old female admitted for hypokalemia. Patient has PMHX 

of diabetes. Patient was admitted from home where she lives iwth her 

mother, brothers, and son. SW met with patient to verify demographics. 

Patient reported no history of substance abuse or mental health. Tentative 

discharge plan for patient is to return home. No further needs identified.

 Signature: 

RAMIN Hernandez

Date: 

Feb 7, 2020

Time: 

16:15

## 2020-02-07 NOTE — NUR
PT RESTING IN BED WITH  AT BEDSIDE. PT ABLE TO MAKE NEEDS KNOWN. RESPIRATIONS EVEN 
AND UNLABORED WITH NO SOB OR RESPIRATORY DISTRESS. SKIN WARM AND DRY TO TOUCH. SAFETY 
MEASURES IN PLACE. WILL CONTINUE TO MONITOR

## 2020-02-07 NOTE — NUR
RECEIVED BEDSIDE REPORT FROM NIGHTSHIFT NURSE. PT RESTING IN BED UPON ARRIVAL. ABLE TO MAKE 
NEEDS KNOWN. RESPIRATIONS EVEN AND UNLABORED WITH NO SOB OR RESPIRATORY DISTRESS. SKIN WARM 
AND DRY TO TOUCH. IV SITE IN LAC 20G IS CLEAN, DRY, AND INTACT. SAFETY MEASURES IN PLACE. 
WILL CONTINUE TO MONITOR.

## 2020-11-03 NOTE — NUR
My signature indicates my immediate presence and direction of student performance. I am responsible for all treatment, assessment, documentation and billing rendered for this patient.  Elvira Brower, OT   PT AMB TO RESTROOM WITH ASSISTANCE OF CHARGE NURSE, PT ABLE TO AMBULATE, STEADY GAIT. WILL 
CONTINUE TO MONITOR.
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
